# Patient Record
Sex: MALE | Race: WHITE | HISPANIC OR LATINO | URBAN - METROPOLITAN AREA
[De-identification: names, ages, dates, MRNs, and addresses within clinical notes are randomized per-mention and may not be internally consistent; named-entity substitution may affect disease eponyms.]

---

## 2019-07-13 ENCOUNTER — HOSPITAL ENCOUNTER (EMERGENCY)
Facility: HOSPITAL | Age: 34
Discharge: HOME | End: 2019-07-13
Attending: EMERGENCY MEDICINE | Admitting: EMERGENCY MEDICINE
Payer: COMMERCIAL

## 2019-07-13 VITALS
HEIGHT: 70 IN | TEMPERATURE: 97 F | WEIGHT: 160 LBS | BODY MASS INDEX: 22.9 KG/M2 | HEART RATE: 74 BPM | OXYGEN SATURATION: 97 % | SYSTOLIC BLOOD PRESSURE: 121 MMHG | RESPIRATION RATE: 16 BRPM | DIASTOLIC BLOOD PRESSURE: 79 MMHG

## 2019-07-13 DIAGNOSIS — J02.9 ACUTE PHARYNGITIS: Primary | ICD-10-CM

## 2019-07-13 PROCEDURE — 99283 EMERGENCY DEPT VISIT LOW MDM: CPT

## 2019-07-13 PROCEDURE — 99283 EMERGENCY DEPT VISIT LOW MDM: CPT | Performed by: EMERGENCY MEDICINE

## 2019-07-13 RX ORDER — EMTRICITABINE AND TENOFOVIR DISOPROXIL FUMARATE 200; 300 MG/1; MG/1
1 TABLET, FILM COATED ORAL
COMMUNITY

## 2019-07-13 RX ORDER — ALBUTEROL SULFATE 90 UG/1
2 AEROSOL, METERED RESPIRATORY (INHALATION)
COMMUNITY
Start: 2019-07-03 | End: 2019-08-02

## 2019-07-13 RX ADMIN — DEXAMETHASONE SODIUM PHOSPHATE 10 MG: 10 INJECTION, SOLUTION INTRAMUSCULAR; INTRAVENOUS at 02:55

## 2019-07-13 NOTE — ED PROVIDER NOTES
History  Chief Complaint   Patient presents with    Sore Throat     Patient had bronchitis 2 weeks ago and now is better but has a severe sore throat causing difficulty swallowing  36year old male presents for evaluation of sore throat associated with pain on the roof of the mouth  Patient states he was treated for bronchitis with an inhaler 2 weeks ago and has since had resolution of his cough and fever; however, he developed sore throat yesterday with gradual worsening  Patient is sexually active with male partners and currently takes PREP  He recently moved to the area and has not established care with a clinic or pcp yet  History provided by:  Patient  Sore Throat   Location:  Anterior  Quality:  Sore  Severity:  Moderate  Onset quality:  Gradual  Duration:  1 day  Timing:  Constant  Progression:  Worsening  Chronicity:  New  Relieved by:  Nothing  Worsened by:  Swallowing  Ineffective treatments:  None tried  Associated symptoms: no abdominal pain, no chest pain, no cough, no fever, no headaches, no neck stiffness, no rhinorrhea and no shortness of breath    Risk factors comment:  Recent URI, oral sex      Prior to Admission Medications   Prescriptions Last Dose Informant Patient Reported? Taking? albuterol (PROVENTIL HFA,VENTOLIN HFA) 90 mcg/act inhaler   Yes Yes   Sig: Inhale 2 puffs   emtricitabine-tenofovir (TRUVADA) 200-300 mg per tablet   Yes No   Sig: Take 1 tablet by mouth      Facility-Administered Medications: None       History reviewed  No pertinent past medical history  Past Surgical History:   Procedure Laterality Date    TONSILLECTOMY         History reviewed  No pertinent family history  I have reviewed and agree with the history as documented      Social History     Tobacco Use    Smoking status: Never Smoker    Smokeless tobacco: Never Used   Substance Use Topics    Alcohol use: Never     Frequency: Never    Drug use: Yes     Frequency: 14 0 times per week     Types: Marijuana        Review of Systems   Constitutional: Negative for appetite change, diaphoresis, fatigue and fever  HENT: Positive for sore throat  Negative for congestion and rhinorrhea  Respiratory: Negative for cough, chest tightness and shortness of breath  Cardiovascular: Negative for chest pain, palpitations and leg swelling  Gastrointestinal: Negative for abdominal pain, constipation, diarrhea, nausea and vomiting  Genitourinary: Negative for difficulty urinating, dysuria, frequency and hematuria  Musculoskeletal: Negative for myalgias, neck pain and neck stiffness  Skin: Negative for pallor  Neurological: Negative for syncope, weakness and headaches  All other systems reviewed and are negative  Physical Exam  Physical Exam   Constitutional: He appears well-developed and well-nourished  Non-toxic appearance  No distress  HENT:   Head: Normocephalic and atraumatic  Mouth/Throat: Uvula is midline and oropharynx is clear and moist    No oropharyngeal lesions on exam  No exudates  Eyes: Pupils are equal, round, and reactive to light  EOM are normal    Neck: Normal range of motion  No tracheal deviation present  No thyromegaly present  Cardiovascular: Normal rate, regular rhythm, normal heart sounds and intact distal pulses  Pulmonary/Chest: Effort normal and breath sounds normal    Abdominal: Soft  Bowel sounds are normal  He exhibits no distension  There is no tenderness  Lymphadenopathy:     He has no cervical adenopathy  Skin: Skin is warm and dry  He is not diaphoretic  Nursing note and vitals reviewed        Vital Signs  ED Triage Vitals [07/13/19 0224]   Temperature Pulse Respirations Blood Pressure SpO2   (!) 97 °F (36 1 °C) 71 16 121/79 97 %      Temp Source Heart Rate Source Patient Position - Orthostatic VS BP Location FiO2 (%)   Tympanic Monitor Lying Right arm --      Pain Score       7           Vitals:    07/13/19 0224 07/13/19 0245   BP: 121/79    Pulse: 71 74   Patient Position - Orthostatic VS: Lying          Visual Acuity      ED Medications  Medications   dexamethasone 10 mg/mL oral liquid 10 mg 1 mL (10 mg Oral Given 7/13/19 0255)       Diagnostic Studies  Results Reviewed     None                 No orders to display              Procedures  Procedures       ED Course                               MDM  Number of Diagnoses or Management Options  Acute pharyngitis: new and requires workup  Diagnosis management comments: 36year old male presents with sore throat  No oropharyngeal lesions or exudates on exam  Decadron given for symptomatic relief  Information for ID and infolink given as patient does not have anyone to follow up with for his PREP  Return precautions provided  Patient Progress  Patient progress: stable      Disposition  Final diagnoses:   Acute pharyngitis     Time reflects when diagnosis was documented in both MDM as applicable and the Disposition within this note     Time User Action Codes Description Comment    7/13/2019  2:52 AM Deana Hastings [J02 [id number]] Acute pharyngitis       ED Disposition     ED Disposition Condition Date/Time Comment    Discharge Stable Sat Jul 13, 2019  2:52 AM [address] [address] discharge to home/self care              Follow-up Information     Follow up With Specialties Details Why Contact Info Additional Information    Nisha Coley MD Infectious Diseases, Multidisciplinary Schedule an appointment as soon as possible for a visit in 1 week with clinic for management of your prep [address]  Call  for help finding a local primary care doctor [phone]       [facility] Emergency Department Emergency Medicine Go to  If symptoms worsen [address] [place] [address] [address], [place] [address], [place], [place], [address]          Discharge Medication List as of 7/13/2019  2:53 AM      CONTINUE these medications which have NOT CHANGED    Details   albuterol (PROVENTIL HFA,VENTOLIN HFA) 90 mcg/act inhaler Inhale 2 puffs, Starting Wed 7/3/2019, Until Fri 8/2/2019, Historical Med      emtricitabine-tenofovir (TRUVADA) 200-300 mg per tablet Take 1 tablet by mouth, Historical Med           No discharge procedures on file      ED Provider  Electronically Signed by           Isa Lopez MD  07/14/19 6131

## 2019-07-13 NOTE — DISCHARGE INSTRUCTIONS
